# Patient Record
Sex: FEMALE | Race: WHITE | URBAN - METROPOLITAN AREA
[De-identification: names, ages, dates, MRNs, and addresses within clinical notes are randomized per-mention and may not be internally consistent; named-entity substitution may affect disease eponyms.]

---

## 2020-09-21 ENCOUNTER — OFFICE VISIT (OUTPATIENT)
Dept: FAMILY MEDICINE CLINIC | Age: 60
End: 2020-09-21
Payer: COMMERCIAL

## 2020-09-21 VITALS
HEIGHT: 66 IN | DIASTOLIC BLOOD PRESSURE: 73 MMHG | WEIGHT: 178 LBS | OXYGEN SATURATION: 95 % | TEMPERATURE: 99 F | HEART RATE: 68 BPM | BODY MASS INDEX: 28.61 KG/M2 | SYSTOLIC BLOOD PRESSURE: 116 MMHG

## 2020-09-21 PROCEDURE — 99202 OFFICE O/P NEW SF 15 MIN: CPT | Performed by: NURSE PRACTITIONER

## 2020-09-21 RX ORDER — CEFDINIR 300 MG/1
600 CAPSULE ORAL DAILY
Qty: 14 CAPSULE | Refills: 0 | Status: SHIPPED | OUTPATIENT
Start: 2020-09-21 | End: 2020-09-28

## 2020-09-21 ASSESSMENT — PATIENT HEALTH QUESTIONNAIRE - PHQ9
SUM OF ALL RESPONSES TO PHQ QUESTIONS 1-9: 0
1. LITTLE INTEREST OR PLEASURE IN DOING THINGS: 0
2. FEELING DOWN, DEPRESSED OR HOPELESS: 0
SUM OF ALL RESPONSES TO PHQ9 QUESTIONS 1 & 2: 0
SUM OF ALL RESPONSES TO PHQ QUESTIONS 1-9: 0

## 2020-09-21 NOTE — PATIENT INSTRUCTIONS
Patient Education        Ear Infection (Otitis Media): Care Instructions  Your Care Instructions     An ear infection may start with a cold and affect the middle ear (otitis media). It can hurt a lot. Most ear infections clear up on their own in a couple of days. Most often you will not need antibiotics. This is because many ear infections are caused by a virus. Antibiotics don't work against a virus. Regular doses of pain medicines are the best way to reduce your fever and help you feel better. Follow-up care is a key part of your treatment and safety. Be sure to make and go to all appointments, and call your doctor if you are having problems. It's also a good idea to know your test results and keep a list of the medicines you take. How can you care for yourself at home? · Take pain medicines exactly as directed. ? If the doctor gave you a prescription medicine for pain, take it as prescribed. ? If you are not taking a prescription pain medicine, take an over-the-counter medicine, such as acetaminophen (Tylenol), ibuprofen (Advil, Motrin), or naproxen (Aleve). Read and follow all instructions on the label. ? Do not take two or more pain medicines at the same time unless the doctor told you to. Many pain medicines have acetaminophen, which is Tylenol. Too much acetaminophen (Tylenol) can be harmful. · Plan to take a full dose of pain reliever before bedtime. Getting enough sleep will help you get better. · Try a warm, moist washcloth on the ear. It may help relieve pain. · If your doctor prescribed antibiotics, take them as directed. Do not stop taking them just because you feel better. You need to take the full course of antibiotics. When should you call for help? Call your doctor now or seek immediate medical care if:  · You have new or increasing ear pain. · You have new or increasing pus or blood draining from your ear. · You have a fever with a stiff neck or a severe headache.   Watch closely for changes in your health, and be sure to contact your doctor if:  · You have new or worse symptoms. · You are not getting better after taking an antibiotic for 2 days. Where can you learn more? Go to https://chkurtis.Coverity. org and sign in to your [employer] account. Enter D459 in the [facility] box to learn more about \"Ear Infection (Otitis Media): Care Instructions. \"     If you do not have an account, please click on the \"Sign Up Now\" link. Current as of: July 29, 2019               Content Version: 12.5  © 4878-7521 [employer]. Care instructions adapted under license by [employer] [facility] ([facility]). If you have questions about a medical condition or this instruction, always ask your healthcare professional. [address] any warranty or liability for your use of this information. Patient Education        Keeping Ears Dry: Care Instructions  Your Care Instructions  Your doctor wants you to keep water from getting into your ears. You may need to do this because of a ruptured eardrum, an ear infection, or other ear problems. Follow-up care is a key part of your treatment and safety. Be sure to make and go to all appointments, and call your doctor if you are having problems. It's also a good idea to know your test results and keep a list of the medicines you take. How can you care for yourself at home? · Take baths until your doctor says you can take showers again. Avoid getting water in the ear until after the problem clears up. Ask your doctor if you should use earplugs to keep water out of your ears. · Do not swim until your doctor says you can. · If you get water in your ears, turn your head to each side and pull the earlobe in different directions. This will help the water run out. If your ears are still wet, use a hair dryer set on the lowest heat. Hold the dryer several inches from your ear. · Use your medicines exactly as prescribed.  Call your doctor if you think you are having a problem with your medicine. Do not put drops in your ears unless your doctor prescribes them. When should you call for help? Watch closely for changes in your health, and be sure to contact your doctor if you have any problems. Where can you learn more? Go to https://chpepiceweb.Encore [email or web address] org and sign in to your [employer] account. Enter Y517 in the [employer] box to learn more about \"Keeping Ears Dry: Care Instructions. \"     If you do not have an account, please click on the \"Sign Up Now\" link. Current as of: July 29, 2019               Content Version: 12.5  © 5978-2942 [employer]. Care instructions adapted under license by [facility] ([facility]). If you have questions about a medical condition or this instruction, always ask your healthcare professional. [address] any warranty or liability for your use of this information.

## 2020-09-21 NOTE — PROGRESS NOTES
weakness, light-headedness and headaches. Hematological: Negative for adenopathy. All other systems reviewed and are negative. Objective:      Physical Exam  Vitals signs and nursing note reviewed. Constitutional:       General: She is not in acute distress. Appearance: Normal appearance. She is well-developed. She is not ill-appearing, toxic-appearing or diaphoretic. HENT:      Head: Normocephalic. Right Ear: Tympanic membrane and external ear normal.      Left Ear: Drainage (purulent drainage noted to the left ear canal, unable to visualize left TM due to purulent drainage) present. Nose: Nose normal.      Right Sinus: No maxillary sinus tenderness or frontal sinus tenderness. Left Sinus: No maxillary sinus tenderness or frontal sinus tenderness. Mouth/Throat:      Pharynx: No oropharyngeal exudate or posterior oropharyngeal erythema. Eyes:      General:         Right eye: No discharge. Left eye: No discharge. Cardiovascular:      Rate and Rhythm: Normal rate and regular rhythm. Heart sounds: Normal heart sounds. No murmur. Pulmonary:      Effort: Pulmonary effort is normal. No respiratory distress. Breath sounds: Normal breath sounds. No wheezing or rales. Lymphadenopathy:      Cervical: No cervical adenopathy. Skin:     General: Skin is warm. Findings: No rash. Neurological:      Mental Status: She is alert. /73 (Site: Right Upper Arm, Position: Sitting, Cuff Size: Large Adult)   Pulse 68   Temp 99 °F (37.2 °C) (Tympanic)   Ht 5' 6\" (1.676 m)   Wt 178 lb (80.7 kg)   SpO2 95%   BMI 28.73 kg/m²     Assessment:       Diagnosis Orders   1. Acute otitis media of left ear with perforated tympanic membrane  cefdinir (OMNICEF) 300 MG capsule    neomycin-polymyxin-hydrocortisone (CORTISPORIN) 3.5-03181-1 otic solution     Plan:      Patient instructed to complete antibiotic prescription fully. Coritsporin ear gtts.   Keep left ear dry.  May use Motrin/Tylenol for fever/pain. Warm compresses as desired for ear pain. Patient agreeable to treatment plan. Educational materials provided on AVS.  Follow up if symptoms do not improve. Orders Placed This Encounter   Medications    cefdinir (OMNICEF) 300 MG capsule     Sig: Take 2 capsules by mouth daily for 7 days     Dispense:  14 capsule     Refill:  0    neomycin-polymyxin-hydrocortisone (CORTISPORIN) 3.5-06211-2 otic solution     Sig: Place 3 drops in ear(s) 3 times daily for 10 days     Dispense:  1 each     Refill:  0        Patient given educational materials - see patient instructions. Discussed use, benefit, and side effects of prescribed medications. All patientquestions answered. Pt voiced understanding.     Electronically signed by SUSIE Ravi CNP on 9/23/2020at 8:29 AM

## 2020-09-23 ASSESSMENT — ENCOUNTER SYMPTOMS
SINUS PRESSURE: 0
CHEST TIGHTNESS: 0
VOICE CHANGE: 0
COUGH: 0
SORE THROAT: 0
WHEEZING: 0
SHORTNESS OF BREATH: 0
EYE REDNESS: 0
EYE DISCHARGE: 0

## 2021-03-12 ENCOUNTER — HOSPITAL ENCOUNTER (OUTPATIENT)
Age: 61
Setting detail: SPECIMEN
Discharge: HOME | End: 2021-03-12
Payer: COMMERCIAL

## 2021-03-16 LAB
HPV SAMPLE: NORMAL
HPV, GENOTYPE 16: NOT DETECTED
HPV, GENOTYPE 18: NOT DETECTED
HPV, HIGH RISK OTHER: NOT DETECTED
HPV, INTERPRETATION: NORMAL
SPECIMEN DESCRIPTION: NORMAL

## 2021-03-22 LAB — CYTOLOGY REPORT: NORMAL

## 2022-12-11 ENCOUNTER — OFFICE VISIT (OUTPATIENT)
Dept: PRIMARY CARE CLINIC | Age: 62
End: 2022-12-11
Payer: COMMERCIAL

## 2022-12-11 VITALS
SYSTOLIC BLOOD PRESSURE: 126 MMHG | BODY MASS INDEX: 26.37 KG/M2 | DIASTOLIC BLOOD PRESSURE: 77 MMHG | TEMPERATURE: 98.8 F | HEART RATE: 73 BPM | HEIGHT: 68 IN | OXYGEN SATURATION: 98 % | WEIGHT: 174 LBS

## 2022-12-11 DIAGNOSIS — R05.9 COUGH, UNSPECIFIED TYPE: ICD-10-CM

## 2022-12-11 DIAGNOSIS — J10.1 INFLUENZA A: Primary | ICD-10-CM

## 2022-12-11 LAB
INFLUENZA A ANTIBODY: POSITIVE
INFLUENZA B ANTIBODY: NEGATIVE

## 2022-12-11 PROCEDURE — 87804 INFLUENZA ASSAY W/OPTIC: CPT | Performed by: NURSE PRACTITIONER

## 2022-12-11 PROCEDURE — 99213 OFFICE O/P EST LOW 20 MIN: CPT | Performed by: NURSE PRACTITIONER

## 2022-12-11 RX ORDER — PREDNISONE 20 MG/1
20 TABLET ORAL DAILY
Qty: 5 TABLET | Refills: 0 | Status: SHIPPED | OUTPATIENT
Start: 2022-12-11 | End: 2022-12-16

## 2022-12-11 RX ORDER — DEXTROMETHORPHAN HYDROBROMIDE AND PROMETHAZINE HYDROCHLORIDE 15; 6.25 MG/5ML; MG/5ML
5 SYRUP ORAL 4 TIMES DAILY PRN
Qty: 150 ML | Refills: 0 | Status: SHIPPED | OUTPATIENT
Start: 2022-12-11 | End: 2022-12-18

## 2022-12-11 SDOH — ECONOMIC STABILITY: FOOD INSECURITY: WITHIN THE PAST 12 MONTHS, THE FOOD YOU BOUGHT JUST DIDN'T LAST AND YOU DIDN'T HAVE MONEY TO GET MORE.: NEVER TRUE

## 2022-12-11 SDOH — ECONOMIC STABILITY: FOOD INSECURITY: WITHIN THE PAST 12 MONTHS, YOU WORRIED THAT YOUR FOOD WOULD RUN OUT BEFORE YOU GOT MONEY TO BUY MORE.: NEVER TRUE

## 2022-12-11 ASSESSMENT — ENCOUNTER SYMPTOMS
VOMITING: 0
RHINORRHEA: 1
SHORTNESS OF BREATH: 0
COUGH: 1
SINUS PAIN: 0
SORE THROAT: 1
ABDOMINAL PAIN: 0
NAUSEA: 0
DIARRHEA: 0

## 2022-12-11 ASSESSMENT — SOCIAL DETERMINANTS OF HEALTH (SDOH): HOW HARD IS IT FOR YOU TO PAY FOR THE VERY BASICS LIKE FOOD, HOUSING, MEDICAL CARE, AND HEATING?: NOT HARD AT ALL

## 2022-12-11 ASSESSMENT — PATIENT HEALTH QUESTIONNAIRE - PHQ9
SUM OF ALL RESPONSES TO PHQ QUESTIONS 1-9: 0
SUM OF ALL RESPONSES TO PHQ QUESTIONS 1-9: 0
1. LITTLE INTEREST OR PLEASURE IN DOING THINGS: 0
2. FEELING DOWN, DEPRESSED OR HOPELESS: 0
SUM OF ALL RESPONSES TO PHQ QUESTIONS 1-9: 0
SUM OF ALL RESPONSES TO PHQ9 QUESTIONS 1 & 2: 0
SUM OF ALL RESPONSES TO PHQ QUESTIONS 1-9: 0

## 2022-12-11 NOTE — PROGRESS NOTES
[address] [facility]  [address] [address]  Dept: [phone]  Dept Fax: [phone]    Jason Martínez is a 57 y.o. female who presents today for her medicalconditions/complaints as noted below. Jason Martínez is c/o of Head Congestion and Chest Congestion (Fatigue and body aches)      HPI:       75-year-old female patient presents with concerns for cough congestion. Patient reports symptoms for the past 3 days. Reports no fever has had chills body aches fatigue nasal congestion rhinorrhea and mild cough. Denies chest pain or shortness of breath. Denies vomiting diarrhea. Treatments tried include Advil, over-the-counter regimen. No specific sick contacts. Past Medical History:   Diagnosis Date    Hypoglycemia     dx 35+ years ago        Current Outpatient Medications   Medication Sig Dispense Refill    promethazine-dextromethorphan (PROMETHAZINE-DM) 6.25-15 MG/5ML syrup Take 5 mLs by mouth 4 times daily as needed for Cough 150 mL 0    predniSONE (DELTASONE) 20 MG tablet Take 1 tablet by mouth daily for 5 days 5 tablet 0    Multiple Vitamin (MULTIVITAMIN PO) Take by mouth       No current facility-administered medications for this visit. Allergies   Allergen Reactions    Penicillins Hives    Tape Coral Agatha Tape] Rash       Subjective:      Review of Systems   Constitutional:  Positive for chills and fatigue. Negative for fever. HENT:  Positive for congestion, rhinorrhea and sore throat. Negative for ear pain and sinus pain. Respiratory:  Positive for cough. Negative for shortness of breath. Cardiovascular:  Negative for chest pain and palpitations. Gastrointestinal:  Negative for abdominal pain, diarrhea, nausea and vomiting. Neurological:  Negative for dizziness and headaches. All other systems reviewed and are negative.    :Objective     Physical Exam  Vitals and nursing note reviewed. Constitutional:       General: She is not in acute distress. Appearance: Normal appearance. She is not toxic-appearing. HENT:      Right Ear: Tympanic membrane normal.      Left Ear: Tympanic membrane normal.      Nose: Congestion and rhinorrhea present. Mouth/Throat:      Pharynx: Posterior oropharyngeal erythema present. Cardiovascular:      Rate and Rhythm: Normal rate. Pulmonary:      Effort: Pulmonary effort is normal.      Breath sounds: Normal breath sounds. No wheezing. Neurological:      Mental Status: She is alert. /77   Pulse 73   Temp 98.8 °F (37.1 °C)   Ht 5' 8\" (1.727 m)   Wt 174 lb (78.9 kg)   SpO2 98%   BMI 26.46 kg/m²     Lab Review   No visits with results within 6 Month(s) from this visit. Latest known visit with results is:   Orders Only on 04/16/2021   Component Date Value    Cholesterol, Fasting 04/15/2021 193     Triglyceride, Fasting 04/15/2021 51     HDL 04/15/2021 71 (A)     LDL Calculated 04/15/2021 112        Assessment and Plan      1. Influenza A  -     promethazine-dextromethorphan (PROMETHAZINE-DM) 6.25-15 MG/5ML syrup; Take 5 mLs by mouth 4 times daily as needed for Cough, Disp-150 mL, R-0Normal  -     predniSONE (DELTASONE) 20 MG tablet; Take 1 tablet by mouth daily for 5 days, Disp-5 tablet, R-0Normal  2. Cough, unspecified type  -     POCT Influenza A/B  -     promethazine-dextromethorphan (PROMETHAZINE-DM) 6.25-15 MG/5ML syrup; Take 5 mLs by mouth 4 times daily as needed for Cough, Disp-150 mL, R-0Normal  -     predniSONE (DELTASONE) 20 MG tablet; Take 1 tablet by mouth daily for 5 days, Disp-5 tablet, R-0Normal         Positive for influenza AB  Past the window for Tamiflu effectiveness. Recommend prednisone, cough medication. Recommend rest, hydration, Tylenol/Motrin.   Follow-up with PCP return as needed      Results for orders placed or performed in visit on 12/11/22   POCT Influenza A/B   Result Value Ref Range    Influenza A Ab positive Influenza B Ab negative              Return if symptoms worsen or fail to improve. Orders Placed This Encounter   Medications    promethazine-dextromethorphan (PROMETHAZINE-DM) 6.25-15 MG/5ML syrup     Sig: Take 5 mLs by mouth 4 times daily as needed for Cough     Dispense:  150 mL     Refill:  0    predniSONE (DELTASONE) 20 MG tablet     Sig: Take 1 tablet by mouth daily for 5 days     Dispense:  5 tablet     Refill:  0        Patient given educational materials - see patient instructions. Discussed use, benefit, and side effects of prescribed medications. All patientquestions answered. Pt voiced understanding. Patient given educational materials - see patient instructions. Discussed use, benefit, and side effects of prescribed medications. All patientquestions answered. Pt voiced understanding. This note was transcribed using dictation with Dragon services. Efforts were made to correct any errors but some words may be misinterpreted.     Patient assumes risks associated with failure to complete recommended testing and treatments in a timely manner    Electronically signed by SUSIE Estrella CNP on 12/11/2022at 1:58 PM